# Patient Record
Sex: MALE | Race: WHITE | NOT HISPANIC OR LATINO | URBAN - METROPOLITAN AREA
[De-identification: names, ages, dates, MRNs, and addresses within clinical notes are randomized per-mention and may not be internally consistent; named-entity substitution may affect disease eponyms.]

---

## 2022-02-22 ENCOUNTER — NEW PATIENT (OUTPATIENT)
Dept: URBAN - METROPOLITAN AREA CLINIC 11 | Facility: CLINIC | Age: 52
End: 2022-02-22

## 2022-02-22 DIAGNOSIS — H43.313: ICD-10-CM

## 2022-02-22 DIAGNOSIS — H43.813: ICD-10-CM

## 2022-02-22 PROCEDURE — 92201 OPSCPY EXTND RTA DRAW UNI/BI: CPT

## 2022-02-22 PROCEDURE — 99204 OFFICE O/P NEW MOD 45 MIN: CPT

## 2022-02-22 ASSESSMENT — TONOMETRY
OD_IOP_MMHG: 15
OS_IOP_MMHG: 15

## 2022-02-22 ASSESSMENT — VISUAL ACUITY
OS_CC: 20/25-1
OD_CC: 20/20-1

## 2022-02-22 NOTE — PATIENT DISCUSSION
45 minutes was spent in with Mr. Brittany Aguilera He expressed his frustration with noticing the floaters in both eyes.  We discussed the fact that he has very early cataract in each eye and understands that he will develop a cataract quicker in each eye after this surgery.  Patient understands condition and desires to proceed.  We will plan the right eye first followed shortly after in the left eye. Discussed condition, procedure risks and benefits.

## 2022-02-22 NOTE — PATIENT DISCUSSION
I discussed with patient that there are dense membranes and strands present in both eyes.  I recommended PPV OU.